# Patient Record
Sex: FEMALE | Race: WHITE | URBAN - METROPOLITAN AREA
[De-identification: names, ages, dates, MRNs, and addresses within clinical notes are randomized per-mention and may not be internally consistent; named-entity substitution may affect disease eponyms.]

---

## 2022-05-13 ENCOUNTER — OFFICE VISIT (OUTPATIENT)
Dept: URBAN - METROPOLITAN AREA CLINIC 89 | Facility: CLINIC | Age: 62
End: 2022-05-13
Payer: MEDICAID

## 2022-05-13 DIAGNOSIS — H52.223 REGULAR ASTIGMATISM, BILATERAL: ICD-10-CM

## 2022-05-13 DIAGNOSIS — E11.9 DIABETES MELLITUS TYPE 2 WITHOUT MENTION OF COMPLICATION: Primary | ICD-10-CM

## 2022-05-13 DIAGNOSIS — H25.13 AGE-RELATED NUCLEAR CATARACT, BILATERAL: ICD-10-CM

## 2022-05-13 PROCEDURE — 92004 COMPRE OPH EXAM NEW PT 1/>: CPT | Performed by: OPTOMETRIST

## 2022-05-13 ASSESSMENT — INTRAOCULAR PRESSURE
OD: 17
OS: 17

## 2022-05-13 NOTE — IMPRESSION/PLAN
Impression: Diabetes mellitus Type 2 without mention of complication: P56.2. Plan: Patient educated regarding pathophysiology of DM and that DM is the leading cause of preventable blindness in adults. Educated patient regarding the importance of good blood sugar control and A1c monitoring. RTC 1 year else PRN.

## 2023-06-01 ENCOUNTER — OFFICE VISIT (OUTPATIENT)
Dept: URBAN - METROPOLITAN AREA CLINIC 89 | Facility: CLINIC | Age: 63
End: 2023-06-01
Payer: MEDICAID

## 2023-06-01 DIAGNOSIS — H52.223 REGULAR ASTIGMATISM, BILATERAL: ICD-10-CM

## 2023-06-01 DIAGNOSIS — E11.9 DIABETES MELLITUS TYPE 2 WITHOUT MENTION OF COMPLICATION: Primary | ICD-10-CM

## 2023-06-01 DIAGNOSIS — H25.13 AGE-RELATED NUCLEAR CATARACT, BILATERAL: ICD-10-CM

## 2023-06-01 DIAGNOSIS — H11.32 SUBCONJUNCTIVAL HEMORRHAGE OF LEFT EYE: ICD-10-CM

## 2023-06-01 PROCEDURE — 92015 DETERMINE REFRACTIVE STATE: CPT | Performed by: OPTOMETRIST

## 2023-06-01 PROCEDURE — 99213 OFFICE O/P EST LOW 20 MIN: CPT | Performed by: OPTOMETRIST

## 2023-06-01 ASSESSMENT — INTRAOCULAR PRESSURE
OD: 17
OS: 18

## 2023-06-01 NOTE — IMPRESSION/PLAN
Impression: Subconjunctival hemorrhage of left eye: H11.32. Plan: Discussion with patient that this is generally a benign and self limiting event and is often associated with coughing, sneezing and other valsalva maneuvers as well as anti-coagulants. Patient is advised to RTC if recurs during the next 6 months to ruleout other conditions.

## 2023-06-01 NOTE — IMPRESSION/PLAN
Impression: Regular astigmatism, bilateral: H52.223.  Plan: Computer Rx as written last year was filled incorrectly with a full power bifocal.

## 2023-06-01 NOTE — IMPRESSION/PLAN
Impression: Diabetes mellitus Type 2 without mention of complication: A87.4. Plan: Patient educated regarding pathophysiology of DM and that DM is the leading cause of preventable blindness in adults. Educated patient regarding the importance of good blood sugar control and A1c monitoring. RTC 1 year else PRN.

## 2025-06-03 ENCOUNTER — OFFICE VISIT (OUTPATIENT)
Dept: URBAN - METROPOLITAN AREA CLINIC 89 | Facility: CLINIC | Age: 65
End: 2025-06-03
Payer: MEDICARE

## 2025-06-03 DIAGNOSIS — H25.13 AGE-RELATED NUCLEAR CATARACT, BILATERAL: ICD-10-CM

## 2025-06-03 DIAGNOSIS — E11.9 DIABETES MELLITUS TYPE 2 WITHOUT MENTION OF COMPLICATION: Primary | ICD-10-CM

## 2025-06-03 DIAGNOSIS — H52.223 REGULAR ASTIGMATISM, BILATERAL: ICD-10-CM

## 2025-06-03 PROCEDURE — 92014 COMPRE OPH EXAM EST PT 1/>: CPT | Performed by: OPTOMETRIST

## 2025-06-03 ASSESSMENT — INTRAOCULAR PRESSURE
OS: 20
OD: 21